# Patient Record
Sex: MALE | Race: AMERICAN INDIAN OR ALASKA NATIVE
[De-identification: names, ages, dates, MRNs, and addresses within clinical notes are randomized per-mention and may not be internally consistent; named-entity substitution may affect disease eponyms.]

---

## 2019-03-09 ENCOUNTER — HOSPITAL ENCOUNTER (EMERGENCY)
Dept: HOSPITAL 5 - ED | Age: 39
Discharge: HOME | End: 2019-03-09
Payer: COMMERCIAL

## 2019-03-09 VITALS — SYSTOLIC BLOOD PRESSURE: 138 MMHG | DIASTOLIC BLOOD PRESSURE: 92 MMHG

## 2019-03-09 DIAGNOSIS — R11.2: ICD-10-CM

## 2019-03-09 DIAGNOSIS — R19.7: ICD-10-CM

## 2019-03-09 DIAGNOSIS — E11.9: ICD-10-CM

## 2019-03-09 DIAGNOSIS — R07.89: ICD-10-CM

## 2019-03-09 DIAGNOSIS — I10: ICD-10-CM

## 2019-03-09 DIAGNOSIS — R10.32: Primary | ICD-10-CM

## 2019-03-09 LAB
ALBUMIN SERPL-MCNC: 4.4 G/DL (ref 3.9–5)
ALT SERPL-CCNC: 22 UNITS/L (ref 7–56)
BACTERIA #/AREA URNS HPF: (no result) /HPF
BILIRUB UR QL STRIP: (no result)
BLOOD UR QL VISUAL: (no result)
BUN SERPL-MCNC: 16 MG/DL (ref 9–20)
BUN/CREAT SERPL: 23 %
CALCIUM SERPL-MCNC: 9 MG/DL (ref 8.4–10.2)
HCT VFR BLD CALC: 49.9 % (ref 35.5–45.6)
HEMOLYSIS INDEX: 39
HGB BLD-MCNC: 16.4 GM/DL (ref 11.8–15.2)
MCHC RBC AUTO-ENTMCNC: 33 % (ref 32–34)
MCV RBC AUTO: 88 FL (ref 84–94)
MUCOUS THREADS #/AREA URNS HPF: (no result) /HPF
PH UR STRIP: 5 [PH] (ref 5–7)
PLATELET # BLD: 222 K/MM3 (ref 140–440)
RBC # BLD AUTO: 5.7 M/MM3 (ref 3.65–5.03)
RBC #/AREA URNS HPF: 12 /HPF (ref 0–6)
UROBILINOGEN UR-MCNC: < 2 MG/DL (ref ?–2)
WBC #/AREA URNS HPF: 0 /HPF (ref 0–6)

## 2019-03-09 PROCEDURE — 83690 ASSAY OF LIPASE: CPT

## 2019-03-09 PROCEDURE — 36415 COLL VENOUS BLD VENIPUNCTURE: CPT

## 2019-03-09 PROCEDURE — 82805 BLOOD GASES W/O2 SATURATION: CPT

## 2019-03-09 PROCEDURE — 93005 ELECTROCARDIOGRAM TRACING: CPT

## 2019-03-09 PROCEDURE — 85027 COMPLETE CBC AUTOMATED: CPT

## 2019-03-09 PROCEDURE — 71046 X-RAY EXAM CHEST 2 VIEWS: CPT

## 2019-03-09 PROCEDURE — 99284 EMERGENCY DEPT VISIT MOD MDM: CPT

## 2019-03-09 PROCEDURE — 96361 HYDRATE IV INFUSION ADD-ON: CPT

## 2019-03-09 PROCEDURE — 96375 TX/PRO/DX INJ NEW DRUG ADDON: CPT

## 2019-03-09 PROCEDURE — 93010 ELECTROCARDIOGRAM REPORT: CPT

## 2019-03-09 PROCEDURE — 82962 GLUCOSE BLOOD TEST: CPT

## 2019-03-09 PROCEDURE — 84484 ASSAY OF TROPONIN QUANT: CPT

## 2019-03-09 PROCEDURE — 80053 COMPREHEN METABOLIC PANEL: CPT

## 2019-03-09 PROCEDURE — 96374 THER/PROPH/DIAG INJ IV PUSH: CPT

## 2019-03-09 PROCEDURE — 81001 URINALYSIS AUTO W/SCOPE: CPT

## 2019-03-09 PROCEDURE — 74176 CT ABD & PELVIS W/O CONTRAST: CPT

## 2019-03-09 NOTE — EMERGENCY DEPARTMENT REPORT
Chief Complaint: Nausea/Vomiting/Diarrhea


Stated Complaint: CHEST PAIN/VOMITING/DIARRHEA


Time Seen by Provider: 03/09/19 16:27





- HPI


History of Present Illness: 





CO ABD PAIN  AND NV





RX 


METFORMIN





PMH


DM





PSH


NONE





PCP


PASTOR TELLEZ





ETOH


NO CIG


NO THC


NO DRUGS





MSE COMPLETED


MSE screening note: 


Focused history and physical exam performed.


Due to findings the following was ordered:











ED Disposition for MSE


Condition: Stable

## 2019-03-09 NOTE — XRAY REPORT
PROCEDURE: Chest. 

 

TECHNIQUE:  PA and lateral views. 

 

HISTORY: Chest pain. 

 

COMPARISONS: None.  

 

FINDINGS: 

 

The heart and mediastinum appear normal. The lungs are clear and well-expanded. The soft tissues and 
regional skeleton are unremarkable. 

 

IMPRESSION:  

 

Normal study. 

 

This document is electronically signed by Manish Vivas MD., March 9 2019 07:28:45 PM ET

## 2019-03-09 NOTE — EMERGENCY DEPARTMENT REPORT
ED General Adult HPI





- General


Chief complaint: Nausea/Vomiting/Diarrhea


Stated complaint: CHEST PAIN/VOMITING/DIARRHEA


Time Seen by Provider: 03/09/19 16:27


Source: patient


Mode of arrival: Ambulatory


Limitations: No Limitations





- History of Present Illness


Initial comments: 


Patient 38-year-old  male with history of diabetes and hypertensi

on who presents for chest pain and abdominal pain with nausea vomiting 5/10 for 

the past 3 days  associated nausea and vomiting 2 hours ago last episode by 

mouth intake was this a.m.  chest exacerbated by exertion and relieved by 

nothing patient endorses occasional EtOH and back pain there is no shortness of 

breath no diaphoresis no dizziness or lightheadedness at this time 


Onset/Timing: 3


-: days(s)


Location: chest, back, abdomen


Radiation: back, abdomen


Severity scale (0 -10): 8


Quality: sharp


Consistency: constant


Improves with: none


Worsens with: eating, movement


Associated Symptoms: chest pain, malaise, nausea/vomiting


Treatments Prior to Arrival: none





- Related Data


                                  Previous Rx's











 Medication  Instructions  Recorded  Last Taken  Type


 


Ondansetron [Zofran Odt] 4 mg PO Q8HR PRN #12 tab.rapdis 03/09/19 Unknown Rx


 


traMADol [Ultram] 50 mg PO Q6HR PRN #12 tablet 03/09/19 Unknown Rx











                                    Allergies











Allergy/AdvReac Type Severity Reaction Status Date / Time


 


No Known Allergies Allergy   Unverified 03/09/19 15:00














ED Review of Systems


ROS: 


Stated complaint: CHEST PAIN/VOMITING/DIARRHEA


Other details as noted in HPI





Constitutional: malaise


Eyes: denies: eye pain, eye discharge, vision change


ENT: denies: ear pain, throat pain


Respiratory: denies: cough, shortness of breath, wheezing


Cardiovascular: chest pain


Endocrine: no symptoms reported


Gastrointestinal: abdominal pain, nausea, vomiting, diarrhea.  denies: consti

pation, hematemesis, melena, hematochezia


Genitourinary: denies: urgency, dysuria


Musculoskeletal: back pain


Skin: denies: rash, lesions


Neurological: denies: headache, weakness, paresthesias


Psychiatric: denies: anxiety, depression


Hematological/Lymphatic: denies: easy bleeding, easy bruising





ED Past Medical Hx





- Past Medical History


Previous Medical History?: Yes


Hx Diabetes: Yes





- Surgical History


Past Surgical History?: No





- Social History


Smoking Status: Never Smoker


Substance Use Type: Alcohol





- Medications


Home Medications: 


                                Home Medications











 Medication  Instructions  Recorded  Confirmed  Last Taken  Type


 


Ondansetron [Zofran Odt] 4 mg PO Q8HR PRN #12 tab.rapdis 03/09/19  Unknown Rx


 


traMADol [Ultram] 50 mg PO Q6HR PRN #12 tablet 03/09/19  Unknown Rx














ED Physical Exam





- General


Limitations: No Limitations


General appearance: alert, in no apparent distress





- Head


Head exam: Present: atraumatic, normocephalic





- Eye


Eye exam: Present: normal appearance, PERRL, EOMI


Pupils: Present: normal accommodation





- ENT


ENT exam: Present: normal orophraynx, mucous membranes moist





- Neck


Neck exam: Present: normal inspection, full ROM.  Absent: tenderness, 

meningismus, lymphadenopathy, thyromegaly





- Respiratory


Respiratory exam: Present: normal lung sounds bilaterally.  Absent: respiratory 

distress, wheezes, stridor, chest wall tenderness





- Cardiovascular


Cardiovascular Exam: Present: regular rate, normal rhythm, normal heart sounds. 

Absent: systolic murmur, diastolic murmur, rubs, gallop





- GI/Abdominal


GI/Abdominal exam: Present: soft, tenderness (epigastric ), normal bowel sounds.

 Absent: guarding, rebound, rigid, bruit, hernia





- Expanded GI/Abdominal Exam


  ** Expanded


GI/Abdominal exam: Present: Rovsing's sign.  Absent: psoas sign, obturator sign,

heel tap sign, Buckley's sign, tenderness at [place]'s [place], ascites





- Rectal


Rectal exam: Present: deferred





- Extremities Exam


Extremities exam: Present: normal inspection





- Back Exam


Back exam: Present: normal inspection, full ROM.  Absent: tenderness, CVA 

tenderness (R), CVA tenderness (L), muscle spasm, paraspinal tenderness, 

vertebral tenderness, rash noted





- Neurological Exam


Neurological exam: Present: alert, oriented X3, CN II-XII intact, normal gait





- Psychiatric


Psychiatric exam: Present: normal affect, normal mood





- Skin


Skin exam: Present: warm, dry, intact, normal color.  Absent: rash





ED Medical Decision Making





- Lab Data


Result diagrams: 


                                 03/09/19 18:30





                                 03/09/19 18:30





- Radiology Data


Radiology results: report reviewed, image reviewed


c: MIGUEL GRAHAM NP 








PROCEDURE: CT ABDOMEN PELVIS WO CON 





TECHNIQUE: 





HISTORY: abd pain hx renal stones 





COMPARISONS: None 





FINDINGS: 





Lower Lung fields: No significant abnormalities seen. 





Upper Abdomen: The liver, gallbladder, the adrenal glands, the pancreas and the 

spleen are 


unremarkable. 





Kidneys, Ureters and Urinary bladder: No abnormalities are seen. No renal or ure

teral calculi are 


visualized. There are no renal masses identified. There is no hydronephrosis. 

Urinary bladder is 


nearly empty and difficult to characterize. No gross abnormality is seen. 





Retroperitoneum: Abdominal aorta appears normal. 





Nonspecific subcentimeter lymph nodes are seen in the retroperitoneum. No 

pathologically enlarged 


lymph nodes are identified. 





Bowel: No focal bowel loop abnormalities are visualized. There is no evidence of

bowel obstruction


ascites or free intraperitoneal gas. Normal-appearing appendix is seen in the 

right lower quadrant. 


Small umbilical hernia containing adipose tissue is visualized. No herniated 

loops of bowel are 


seen. 











- Medical Decision Making


CT Abd Pelvis smal umbillical hernia, CXR: normal no infiltrate no opacities, 

labs: normal UA: moderate rbc, wbd, plan tx for UTI , umbilical hernia , hernia 

is stable no obstruction, plan dc to home with rx for zofran, Ultram, Bactrim DS

pt verbalized agreement and understanding of discharge plan. pt dc'd to home in 

stable condition at this time. 





Critical care attestation.: 


If time is entered above; I have spent that time in minutes in the direct care 

of this critically ill patient, excluding procedure time.








ED Disposition


Clinical Impression: 


Abdominal pain


Qualifiers:


 Abdominal location: left lower quadrant Qualified Code(s): R10.32 - Left lower 

quadrant pain





Disposition: DC-01 TO HOME OR SELFCARE


Is pt being admited?: No


Does the pt Need Aspirin: No


Condition: Good


Instructions:  Umbilical Hernia (ED), Urinary Tract Infection in Men (ED)


Prescriptions: 


traMADol [Ultram] 50 mg PO Q6HR PRN #12 tablet


 PRN Reason: Pain


Ondansetron [Zofran Odt] 4 mg PO Q8HR PRN #12 tab.rapdis


 PRN Reason: nausea vomitting


Referrals: 


PRIMARY CARE,MD [Primary Care Provider] - 3-5 Days


BUD FELIX DO [Staff Physician] - 3-5 Days


Forms:  Work/School Release Form(ED)


Time of Disposition: 22:13

## 2019-12-19 ENCOUNTER — HOSPITAL ENCOUNTER (EMERGENCY)
Dept: HOSPITAL 5 - ED | Age: 39
Discharge: HOME | End: 2019-12-19
Payer: SELF-PAY

## 2019-12-19 VITALS — SYSTOLIC BLOOD PRESSURE: 133 MMHG | DIASTOLIC BLOOD PRESSURE: 85 MMHG

## 2019-12-19 DIAGNOSIS — L73.9: Primary | ICD-10-CM

## 2019-12-19 DIAGNOSIS — E11.9: ICD-10-CM

## 2019-12-19 PROCEDURE — 99282 EMERGENCY DEPT VISIT SF MDM: CPT

## 2019-12-19 NOTE — EMERGENCY DEPARTMENT REPORT
Abscess Boil HPI





- HPI


Chief Complaint: Skin/Abscess/Foreign Body


Stated Complaint: HEADACHE


Time Seen by Provider: 12/19/19 04:43


Duration: 5 Days


Location: Head (occipital scalp)


Severity: Moderate


History: Yes Pain, Yes Purulent Drainage, No Fever, No Numbness, No Foreign 

Body, No Previous History, No Insect Bite


HPI: This is a 39-year-old -American male that presents to the emergency 

room with a painful abscess to occipital scalp for 3 days.  Past medical history

of diabetes type 2.  Patient reports purulent foul-smelling discharge.  Patient 

states he is applying cool compresses with minimal improvement in symptoms.  He 

denies numbness or tingling.


Home Medications: 


                                  Previous Rx's











 Medication  Instructions  Recorded  Last Taken  Type


 


Ondansetron [Zofran Odt] 4 mg PO Q8HR PRN #12 tab.rapdis 03/09/19 Unknown Rx


 


Sulfamethoxazole/Trimethoprim 1 each PO BID 10 Days #20 tablet 03/09/19 Unknown 

Rx





[Bactrim DS TAB]    


 


traMADoL [Ultram] 50 mg PO Q6HR PRN #12 tablet 03/09/19 Unknown Rx


 


Clindamycin Phosphate [Clindamycin 1 applicatio TP TID #1 foam 12/19/19 Unknown 

Rx





1% TOPICAL FOAM]    


 


Clindamycin [Clindamycin CAP] 300 mg PO Q6H #40 capsule 12/19/19 Unknown Rx











Allergies/Adverse Reactions: 


                                    Allergies











Allergy/AdvReac Type Severity Reaction Status Date / Time


 


No Known Allergies Allergy   Unverified 03/09/19 15:00














ED Review of Systems


ROS: 


Stated complaint: HEADACHE


Other details as noted in HPI





Constitutional: denies: chills, fever


Respiratory: denies: cough, shortness of breath, wheezing


Cardiovascular: denies: chest pain, palpitations


Gastrointestinal: denies: abdominal pain, nausea, diarrhea


Skin: lesions (painful abscess to occipital scalp).  denies: change in color, ch

boom in hair/nails, pruritus


Neurological: denies: headache, weakness, paresthesias


Psychiatric: denies: anxiety, depression





ED Past Medical Hx





- Past Medical History


Previous Medical History?: Yes


Hx Diabetes: Yes





- Surgical History


Past Surgical History?: No





- Social History


Smoking Status: Never Smoker


Substance Use Type: None





- Medications


Home Medications: 


                                Home Medications











 Medication  Instructions  Recorded  Confirmed  Last Taken  Type


 


Ondansetron [Zofran Odt] 4 mg PO Q8HR PRN #12 tab.rapdis 03/09/19  Unknown Rx


 


Sulfamethoxazole/Trimethoprim 1 each PO BID 10 Days #20 tablet 03/09/19  Unknown

Rx





[Bactrim DS TAB]     


 


traMADoL [Ultram] 50 mg PO Q6HR PRN #12 tablet 03/09/19  Unknown Rx


 


Clindamycin Phosphate [Clindamycin 1 applicatio TP TID #1 foam 12/19/19  Unknown

 Rx





1% TOPICAL FOAM]     


 


Clindamycin [Clindamycin CAP] 300 mg PO Q6H #40 capsule 12/19/19  Unknown Rx














ED Abscess Boil Physical Exam





- Exam


General: 


Vital signs noted. No distress. Alert and acting appropriately.





Front/Back of Body, Lg (Color): 


                            __________________________














                            __________________________





 1 - 1 cm nonfluctuant nodule to occipital scalp, purulent drainage, TTP, no 

surrounding cellulitis





Size: 1 cm


Exam: Yes Tenderness, Yes Normal Neurologic Exam, Yes Normal Circulation, No 

Fluctuance, No Surrounding Cellulites/Erythema, No Lymphangitis, No Crepitation,

No Heart Murmur





ED Course


                                   Vital Signs











  12/19/19





  01:52


 


Temperature 99.4 F


 


Pulse Rate 95 H


 


Respiratory 18





Rate 


 


Blood Pressure 133/85


 


O2 Sat by Pulse 95





Oximetry 











Critical care attestation.: 


If time is entered above; I have spent that time in minutes in the direct care 

of this critically ill patient, excluding procedure time.








ED Medical Decision Making





- Medical Decision Making





This is a 39 y.o. male that presents with a painful abscess to occipital scalp 

for 3 days.  Past medical history of diabetes type 2.  No history of prior 

abscess. Patient is stable and examined by this provider. Physical assessment of

1 cm nonfluctuant nodule to occipital scalp, purulent drainage, TTP.  No acute 

signs of distress noted.  Abscess is currently draining and even with mild 

palpation.  Start trauma doll, clindamycin orally and topically.  Instructed to 

apply warm compress.  Educated patient and spouse on follow up plan to have woun

d reassessed in 2-3 days with a primary care doctor.. Patient agrees to ED plan 

of care. Discharged home and follow up with PCP in 2-3 days.





ED Disposition


Clinical Impression: 


 Folliculitis, Pain due to abscess





Disposition: DC-01 TO HOME OR SELFCARE


Is pt being admited?: No


Condition: Stable


Instructions:  Folliculitis (ED)


Additional Instructions: 


Complete full course of antibiotics as prescribed.


Follow up with a primary care doctor in 2-3 days to have wound reevaluated.


Was sent to the emergency room if foul smelling discharge, swelling, or pain in 

wound.


Prescriptions: 


Clindamycin Phosphate [Clindamycin 1% TOPICAL FOAM] 1 applicatio TP TID #1 foam


Clindamycin [Clindamycin CAP] 300 mg PO Q6H #40 capsule


Referrals: 


[facility] [Outside] - 3-5 Days


[facility] [Outside] - 3-5 Days


[facility] [Outside] - 3-5 Days


Forms:  Work/School Release Form(ED)


Time of Disposition: 05:16

## 2020-02-22 ENCOUNTER — HOSPITAL ENCOUNTER (EMERGENCY)
Dept: HOSPITAL 5 - ED | Age: 40
Discharge: HOME | End: 2020-02-22
Payer: SELF-PAY

## 2020-02-22 VITALS — SYSTOLIC BLOOD PRESSURE: 116 MMHG | DIASTOLIC BLOOD PRESSURE: 78 MMHG

## 2020-02-22 DIAGNOSIS — H53.8: Primary | ICD-10-CM

## 2020-02-22 DIAGNOSIS — E11.9: ICD-10-CM

## 2020-02-22 DIAGNOSIS — Z79.899: ICD-10-CM

## 2020-02-22 LAB
BILIRUB UR QL STRIP: (no result)
BLOOD UR QL VISUAL: (no result)
BUN SERPL-MCNC: 12 MG/DL (ref 9–20)
BUN/CREAT SERPL: 15 %
CALCIUM SERPL-MCNC: 9.9 MG/DL (ref 8.4–10.2)
HEMOLYSIS INDEX: 15
MUCOUS THREADS #/AREA URNS HPF: (no result) /HPF
PH UR STRIP: 7 [PH] (ref 5–7)
PROT UR STRIP-MCNC: (no result) MG/DL
RBC #/AREA URNS HPF: 1 /HPF (ref 0–6)
UROBILINOGEN UR-MCNC: 2 MG/DL (ref ?–2)
WBC #/AREA URNS HPF: 1 /HPF (ref 0–6)

## 2020-02-22 PROCEDURE — 81001 URINALYSIS AUTO W/SCOPE: CPT

## 2020-02-22 PROCEDURE — 80048 BASIC METABOLIC PNL TOTAL CA: CPT

## 2020-02-22 PROCEDURE — 36415 COLL VENOUS BLD VENIPUNCTURE: CPT

## 2020-02-22 PROCEDURE — 82962 GLUCOSE BLOOD TEST: CPT

## 2020-02-22 NOTE — EMERGENCY DEPARTMENT REPORT
- General


Chief complaint: Hyperglycemia


Stated complaint: HIGH BLOOD SUGAR BLURRED VISION


Time Seen by Provider: 02/22/20 01:45


Source: patient


Mode of arrival: Ambulatory


Limitations: No Limitations





- Related Data


                                  Previous Rx's











 Medication  Instructions  Recorded  Last Taken  Type


 


Ondansetron [Zofran Odt] 4 mg PO Q8HR PRN #12 tab.rapdis 03/09/19 Unknown Rx


 


Sulfamethoxazole/Trimethoprim 1 each PO BID 10 Days #20 tablet 03/09/19 Unknown 

Rx





[Bactrim DS TAB]    


 


traMADoL [Ultram] 50 mg PO Q6HR PRN #12 tablet 03/09/19 Unknown Rx


 


Clindamycin Phosphate [Clindamycin 1 applicatio TP TID #1 foam 12/19/19 Unknown 

Rx





1% TOPICAL FOAM]    


 


Clindamycin [Clindamycin CAP] 300 mg PO Q6H #40 capsule 12/19/19 Unknown Rx


 


Metformin HCl [metFORMIN] 1,000 mg PO BID #60 tablet 12/19/19 Unknown Rx


 


traMADoL [Ultram 50 MG tab] 50 mg PO Q6HR PRN #12 tablet 12/19/19 Unknown Rx











                                    Allergies











Allergy/AdvReac Type Severity Reaction Status Date / Time


 


No Known Allergies Allergy   Verified 02/22/20 01:01














ED Review of Systems


ROS: 


Stated complaint: HIGH BLOOD SUGAR BLURRED VISION


Other details as noted in HPI








ED Past Medical Hx





- Past Medical History


Previous Medical History?: Yes


Hx Diabetes: Yes





- Surgical History


Past Surgical History?: No





- Social History


Smoking Status: Never Smoker


Substance Use Type: None





- Medications


Home Medications: 


                                Home Medications











 Medication  Instructions  Recorded  Confirmed  Last Taken  Type


 


Ondansetron [Zofran Odt] 4 mg PO Q8HR PRN #12 tab.rapdis 03/09/19  Unknown Rx


 


Sulfamethoxazole/Trimethoprim 1 each PO BID 10 Days #20 tablet 03/09/19  Unknown

Rx





[Bactrim DS TAB]     


 


traMADoL [Ultram] 50 mg PO Q6HR PRN #12 tablet 03/09/19  Unknown Rx


 


Clindamycin Phosphate [Clindamycin 1 applicatio TP TID #1 foam 12/19/19  Unknown

 Rx





1% TOPICAL FOAM]     


 


Clindamycin [Clindamycin CAP] 300 mg PO Q6H #40 capsule 12/19/19  Unknown Rx


 


Metformin HCl [metFORMIN] 1,000 mg PO BID #60 tablet 12/19/19  Unknown Rx


 


traMADoL [Ultram 50 MG tab] 50 mg PO Q6HR PRN #12 tablet 12/19/19  Unknown Rx














ED Physical Exam





- General


Limitations: No Limitations





ED Course





                                   Vital Signs











  02/22/20





  01:02


 


Temperature 97.9 F


 


Pulse Rate 76


 


Respiratory 18





Rate 


 


Blood Pressure 144/92


 


O2 Sat by Pulse 96





Oximetry 











Critical care attestation.: 


If time is entered above; I have spent that time in minutes in the direct care 

of this critically ill patient, excluding procedure time.








ED Disposition


Condition: Stable


Referrals: 


PRIMARY CARE,MD [Primary Care Provider] - 3-5 Days

## 2022-10-12 NOTE — CAT SCAN REPORT
Last colonoscopy in Nov 2021 did not reveal polyps. The tattoo site was noted in the ascending colon. His colonoscopy in March 2021 revealed a 15mm tubular adenoma removed from the ascending colon although not fully retrieved. An 11mm tubular adenoma was removed from the descending colon. PROCEDURE: CT ABDOMEN PELVIS WO CON 

 

TECHNIQUE: 

 

HISTORY: abd pain  hx renal stones 

 

COMPARISONS: None  

 

FINDINGS: 

 

Lower Lung fields:  No significant abnormalities seen. 

 

Upper Abdomen:  The liver, gallbladder, the adrenal glands, the pancreas and the spleen are unremarka
ble. 

 

Kidneys, Ureters and Urinary bladder:  No abnormalities are seen. No renal or ureteral calculi are vi
sualized. There are no renal masses identified. There is no hydronephrosis. Urinary bladder is nearly
 empty and difficult to characterize. No gross abnormality is seen. 

 

Retroperitoneum: Abdominal aorta appears normal. 

 

Nonspecific subcentimeter lymph nodes are seen in the retroperitoneum.  No pathologically enlarged ly
mph nodes are identified. 

 

Bowel:  No focal bowel loop abnormalities are visualized. There is no evidence of bowel obstruction a
scites or free intraperitoneal gas. Normal-appearing appendix is seen in the right lower quadrant. Sm
all umbilical hernia containing adipose tissue is visualized. No herniated loops of bowel are seen. 

 

Reproductive organs:  Prostate gland does not appear to be significantly enlarged. 

 

Other: No acute bony abnormalities are identified. 

 

IMPRESSION: 

 

No evidence of renal or ureteral calculi. There is no hydronephrosis. 

 

No acute abnormalities are identified. 

 

There is a small umbilical hernia containing adipose tissue. No herniated loops of bowel are seen. 

 

This document is electronically signed by Familia Alex MD., March 9 2019 09:44:41 PM ET